# Patient Record
Sex: FEMALE | Race: OTHER | URBAN - METROPOLITAN AREA
[De-identification: names, ages, dates, MRNs, and addresses within clinical notes are randomized per-mention and may not be internally consistent; named-entity substitution may affect disease eponyms.]

---

## 2017-01-03 ENCOUNTER — OFFICE VISIT (OUTPATIENT)
Dept: FAMILY MEDICINE CLINIC | Age: 32
End: 2017-01-03

## 2017-01-03 VITALS
HEART RATE: 75 BPM | TEMPERATURE: 98.9 F | HEIGHT: 61 IN | SYSTOLIC BLOOD PRESSURE: 115 MMHG | BODY MASS INDEX: 30.21 KG/M2 | WEIGHT: 160 LBS | DIASTOLIC BLOOD PRESSURE: 64 MMHG

## 2017-01-03 DIAGNOSIS — D64.9 ANEMIA, UNSPECIFIED TYPE: Primary | ICD-10-CM

## 2017-01-03 LAB — HGB BLD-MCNC: 9.1 G/DL

## 2017-01-03 RX ORDER — DOCUSATE SODIUM 100 MG/1
100 CAPSULE, LIQUID FILLED ORAL 2 TIMES DAILY
Qty: 180 CAP | Refills: 1 | Status: SHIPPED | OUTPATIENT
Start: 2017-01-03

## 2017-01-03 RX ORDER — FERROUS SULFATE 325(65) MG
325 TABLET, DELAYED RELEASE (ENTERIC COATED) ORAL
Qty: 270 TAB | Refills: 1 | Status: SHIPPED | OUTPATIENT
Start: 2017-01-03

## 2017-01-03 NOTE — PROGRESS NOTES
Results for orders placed or performed in visit on 01/03/17   AMB POC HEMOGLOBIN (HGB)   Result Value Ref Range    Hemoglobin (POC) 9.1

## 2017-01-03 NOTE — PATIENT INSTRUCTIONS
Anemia por deficiencia de marivel: Instrucciones de cuidado - [ Iron Deficiency Anemia: Care Instructions ]  Instrucciones de cuidado    Tener anemia significa que usted no tiene suficientes glóbulos rojos. Los glóbulos rojos transportan oxígeno por el cuerpo. Si tiene anemia, esta puede hacer que se cassandra pálido y que esté débil y cansado. Muchas cosas pueden causar anemia. La causa más común es la pérdida de [place]. [place] puede ocurrir si tiene períodos [employer]. También puede suceder si tiene algún sangrado (hemorragia) en el estómago o los intestinos. También puede tener anemia si no obtiene suficiente marivel en de la cruz dieta o si de la cruz cuerpo tiene dificultades para absorber el marivel. En algunos casos, el embarazo produce anemia. [place] es porque ehsan paco embarazada necesita más marivel. De [place] médico tulio vez le zora más pruebas para encontrar la causa de de la cruz anemia. Si ehsan enfermedad u otro problema de [place] causándola, de la cruz médico tratará jose problema. Es importante que siga viendo a de la cruz médico para asegurarse de que de la cruz nivel de marivel vuelva a la normalidad. La atención de seguimiento es ehsan parte clave de de la cruz tratamiento y seguridad. Asegúrese de hacer y acudir a todas las citas, y llame a de la cruz médico si está teniendo problemas. También es ehsan buena idea saber los resultados de los exámenes y mantener ehsan lista de los medicamentos que matt. ¿Cómo puede cuidarse en el hogar? · Si de la cruz médico le recetó pastillas de marivel, tómelas según las indicaciones. ¨ Trate de tomarlas con el estómago vacío. Puede hacer esto aproximadamente 1 hora antes de comer o 2 horas después de comer. Luciano podría necesitar teresa el marivel con la comida para evitar el malestar estomacal.  ¨ No tome antiácidos, leche ni nada con cafeína dentro de las 2 horas de teresa de la cruz marivel. Esos productos pueden impedir que el cuerpo absorba isidra el marivel. ¨ La vitamina C ayuda a de la cruz organismo a absorber el marivel.  Sería conveniente teresa las pastillas de marivel con un vaso de jugo de naranja o con otro tipo de alimento rico en vitamina C.  ¨ Las pastillas de marivel podrían causarle problemas estomacales. Estos incluyen [employer], náuseas, diarrea, estreñimiento y retortijones. Puede ayudarle teresa bastante cantidad de líquidos e incluir frutas, verduras y [place] en de la cruz alimentación. ¨ Es normal que las pastilla de marivel marianna que martha heces mariela de color verdoso o grisáceo negruzco. Luciano el sangrado interno también puede producir heces oscuras. De modo que es importante que le avise a de la cruz médico acerca de cualquier cambio de color. ¨ Llame a de la cruz médico si zoie estar teniendo problemas con las pastillas de marivel. Incluso después de que empiece a sentirse mejor, de la cruz cuerpo tardará varios meses en acumular Maria A Ball de marivel. ¨ Si olvida treesa [employer], no tome ehsan dosis doble la siguiente vez. ¨ Mantenga las pastillas de marivel fuera del alcance de los niños pequeños. Demasiado marivel puede ser [employer]. · Coma alimentos con mucho marivel. Estos incluyen julio c forde, mariscos, aves y SANDEFELIZARD. Ryan Britoon frijoles (habichuelas), uvas pasas, pan de grano integral y verduras de [employer]. · Prepare las verduras al vapor. Esta es la mejor manera de prepararlas si quiere obtener la mayor cantidad de marivel posible. · Sea kathrine con los medicamentos. No tome analgésicos (medicamentos para el dolor) antiinflamatorios no esteroideos a menos que el médico se lo indique. Estos incluyen aspirina, naproxeno (Aleve) e ibuprofeno (Advil, Motrin). · Las formas líquidas de marivel pueden manchar martha dientes. Luciano usted puede mezclarlo con agua o jugo y beberlo con ehsan pajita (popote). [employer] modo, no se adherirá a martha dientes. ¿Cuándo debe pedir ayuda? Llame al 911 en cualquier momento que considere que necesita atención de [place]. Por ejemplo, llame si:  · Se desmayó (perdió el conocimiento).   · Vomita camilo o algo parecido a granos de café molido. · Las heces son de color rojizo o muy sanguinolentas (con camilo). Llame a de la cruz médico ahora mismo o busque atención médica inmediata si:  · Robyn heces son negruzcas y parecidas al alquitrán o tienen rastros de [place]. · Siente mareos o aturdimiento, o que está a punto de [place]. Preste especial atención a los cambios en de la cruz tiffany y asegúrese de comunicarse con de la cruz médico si:  · La fatiga y la debilidad continúan o [place]mol [place]. · Tiene efectos secundarios después de teresa pastillas de marivel, trenton náuseas, vómito, estreñimiento, diarrea o acidez estomacal.  · No mejora t[place]on se esperaba. ¿Dónde puede encontrar más información en inglés? Hayes Eriberto a [email or web address].[email or web address] Sara Woodward [id number] en la búsqueda para aprender más acerca de \"Anemia por deficiencia de marivel: Instrucciones de cuidado - [ Iron Deficiency Anemia: Care Instructions ]. \"  Revisado: 5 febrero, 2016  Versión del contenido: 11.1  © 7317-3349 Healthwise, Incorporated. Las instrucciones de cuidado fueron adaptadas bajo licencia por [employer] (which disclaims liability or warranty for this information). Si usted tiene [place] afección médica o sobre estas instrucciones, siempre pregunte a de la cruz profesional de tiffany. Healthwise, Incorporated niega toda garantía o responsabilidad por de la cruz uso de esta información.

## 2017-01-03 NOTE — PROGRESS NOTES
Assessment/Plan:       ICD-10-CM ICD-9-CM    1. Anemia, unspecified type D64.9 285.9 AMB POC HEMOGLOBIN (HGB)      ferrous sulfate (IRON) 325 mg (65 mg iron) EC tablet      docusate sodium (COLACE) 100 mg capsule     Follow-up Disposition:  Return in about 2 months (around 3/3/2017). [facility]  Subjective:   Davion Cash is a 36 y.o. PATIENT REFUSED female who speaks Serbian. Chief Complaint   Patient presents with    Eating Concern     craving to eat soil, fatigue and dizziness x 1 month. History of Present Illness: First 3 days really heavy. 2 days ago this period ended. She had low iron in her last pregnancy, has 3 children. She was taking iron then. Review of Systems: Negative for: fever, chest pain, shortness of breath, leg swelling, exertional dyspnea, palpitations. Past Surgical History: She  has no past surgical history on file. Social History: She  reports that she has never smoked. She does not have any smokeless tobacco history on file. She reports that she does not drink alcohol or use illicit drugs. Objective:     Vitals:    01/03/17 1330   BP: 115/64   Pulse: 75   Temp: 98.9 °F (37.2 °C)   TempSrc: Oral   Weight: 160 lb (72.6 kg)   Height: 5' 0.83\" (1.545 m)    Patient's last menstrual period was 12/27/2016. Wt Readings from Last 2 Encounters:   01/03/17 160 lb (72.6 kg)   09/02/14 164 lb (74.4 kg)     Lab Review:  Results for orders placed or performed in visit on 01/03/17   AMB POC HEMOGLOBIN (HGB)   Result Value Ref Range    Hemoglobin (POC) 9.1       Physical Examination: normal skin tone. General appearance - well developed, no acute distress. Chest - clear to auscultation. Heart - regular rate and rhythm without murmurs, rubs, or gallops. Abdomen - bowel sounds present x 4, NT, ND. Extremities - pulses intact. No peripheral edema. Assessment/Plan:   Alexandra Ugarte was seen today for eating concern.     Diagnoses and all orders for this visit:    Anemia, unspecified type  -     AMB POC HEMOGLOBIN (HGB)  -     ferrous sulfate (IRON) 325 mg (65 mg iron) EC tablet; Take 1 Tab by mouth three (3) times daily (with meals). For anemia. Radha 1 tab 3 veces al leticia con comida para anemia.  -     docusate sodium (COLACE) 100 mg capsule; Take 1 Cap by mouth two (2) times a day. To prevent constipation. Radha 1 tab 2 veces al leticia para prevenir estrenimientro. She is also taking Folic Acid since a week ago since she is not currently taking any prenatal vitamins. Return 2 months. Take iron with vit C/orange juice. Follow-up Disposition:  Return in about 2 months (around 3/3/2017). Leslie Cespedes, MSN, RN, FNP-BC, BC-ADM  Tu Burgess expressed understanding of this plan.

## 2022-03-20 PROBLEM — D64.9 ANEMIA: Status: ACTIVE | Noted: 2017-01-03

## 2022-10-18 ENCOUNTER — OFFICE VISIT (OUTPATIENT)
Dept: FAMILY MEDICINE CLINIC | Age: 37
End: 2022-10-18

## 2022-10-18 ENCOUNTER — HOSPITAL ENCOUNTER (OUTPATIENT)
Dept: LAB | Age: 37
Discharge: HOME | End: 2022-10-18

## 2022-10-18 VITALS
TEMPERATURE: 97.2 F | DIASTOLIC BLOOD PRESSURE: 54 MMHG | BODY MASS INDEX: 34.75 KG/M2 | HEART RATE: 63 BPM | HEIGHT: 60 IN | OXYGEN SATURATION: 97 % | WEIGHT: 177 LBS | SYSTOLIC BLOOD PRESSURE: 107 MMHG

## 2022-10-18 DIAGNOSIS — Z01.419 ENCOUNTER FOR WELL WOMAN EXAM: Primary | ICD-10-CM

## 2022-10-18 PROCEDURE — 99395 PREV VISIT EST AGE 18-39: CPT | Performed by: PHYSICIAN ASSISTANT

## 2022-10-18 PROCEDURE — 87624 HPV HI-RISK TYP POOLED RSLT: CPT

## 2022-10-18 PROCEDURE — 88175 CYTOPATH C/V AUTO FLUID REDO: CPT

## 2022-10-18 NOTE — PROGRESS NOTES
[facility]    When was your last pap smear and where? 2019    Any abnormal results from previous pap smears? no    Any problems with your breasts? no    Any family history of breast/ovarian cancer? Yes, maternal aunt, breast cancer    Do you have any kids? 4    Oldest 16 years 15 years 5 years youngest 3 years    Are you sexually active? yes    Are you using any type of birth control? If yes where do you go for this? No, tubal ligation or cut. Abuse screening completed this visit? yes    CMA printed, reviewed and handed patient AVS per provider instructions. Patient had no questions for CMA. This has been fully explained to the patient, who indicates understanding.

## 2022-10-18 NOTE — PROGRESS NOTES
Assessment/Plan:    Diagnoses and all orders for this visit:    1. Encounter for well woman exam  -     PAP IG, APTIMA HPV AND RFX 16/[birth year],45 ([id number]); Future        Follow-up and Dispositions    Return if symptoms worsen or fail to improve. Natasha Munson, STACIE Hernandez expressed understanding of this plan. An AVS was printed and given to the patient.      ----------------------------------------------------------------------    Chief Complaint   Patient presents with    Well Woman     Routine PAP exam         History of Present Illness: Well woman exam she is , 1 SAB, 4 c-sections  She is having normal, monthly periods. Not too heavy. Last 5-6 days  Has some premenstrual crampy pain at times, not enough to take meds  In a safe relationship, no risk for DV  Has had BTL after last pregnancy 3 years ago. Has been breast feeding until recently       History reviewed. No pertinent past medical history. Current Outpatient Medications   Medication Sig Dispense Refill    ferrous sulfate (IRON) 325 mg (65 mg iron) EC tablet Take 1 Tab by mouth three (3) times daily (with meals). For anemia. Radha 1 tab 3 veces al leticia con comida para anemia. (Patient not taking: Reported on 10/18/2022) 270 Tab 1    docusate sodium (COLACE) 100 mg capsule Take 1 Cap by mouth two (2) times a day. To prevent constipation. Radha 1 tab 2 veces al leticia para prevenir estrenimientro. (Patient not taking: Reported on 10/18/2022) 180 Cap 1       No Known Allergies    Social History     Tobacco Use    Smoking status: Never    Smokeless tobacco: Never   Substance Use Topics    Alcohol use: No    Drug use: No       History reviewed. No pertinent family history.     Physical Exam:     Visit Vitals  BP (!) 107/54 (BP 1 Location: Left upper arm, BP Patient Position: Sitting)   Pulse 63   Temp 97.2 °F (36.2 °C) (Temporal)   Ht 5' 0.24\" (1.53 m)   Wt 177 lb (80.3 kg)   LMP 10/12/2022   SpO2 97%   BMI 34.30 kg/m² A&Ox3  WDWN NAD  Respirations normal and non labored    Pelvic exam- ext neg for lesion or discharge, cervix neg for lesion or discharge, some menstrual blood still present, uterus and adnexal exam neg for mass or tenderness

## 2022-10-25 NOTE — PROGRESS NOTES
Please send letter to pt that her pap is negative and her next pap will be due in 5 years, 2027.  Thank you

## 2022-10-26 NOTE — PROGRESS NOTES
At provider's request, a letter with provider comments \"Pap smear is negative and next pap is in 5 years, 2027,\" was drafted and sent to the queue. To be printed and sent to the patient.

## 2023-05-22 RX ORDER — LANOLIN ALCOHOL/MO/W.PET/CERES
325 CREAM (GRAM) TOPICAL
COMMUNITY
Start: 2017-01-03

## 2023-05-22 RX ORDER — PSEUDOEPHEDRINE HCL 30 MG
100 TABLET ORAL 2 TIMES DAILY
COMMUNITY
Start: 2017-01-03

## 2024-05-15 ENCOUNTER — OFFICE VISIT (OUTPATIENT)
Age: 39
End: 2024-05-15

## 2024-05-15 VITALS
SYSTOLIC BLOOD PRESSURE: 95 MMHG | HEART RATE: 69 BPM | OXYGEN SATURATION: 96 % | DIASTOLIC BLOOD PRESSURE: 64 MMHG | BODY MASS INDEX: 33.87 KG/M2 | WEIGHT: 174.8 LBS | TEMPERATURE: 97.8 F

## 2024-05-15 DIAGNOSIS — E66.3 OVERWEIGHT: ICD-10-CM

## 2024-05-15 DIAGNOSIS — J18.9 COMMUNITY ACQUIRED PNEUMONIA OF LEFT UPPER LOBE OF LUNG: Primary | ICD-10-CM

## 2024-05-15 PROCEDURE — 99213 OFFICE O/P EST LOW 20 MIN: CPT | Performed by: PHYSICIAN ASSISTANT

## 2024-05-15 RX ORDER — GUAIFENESIN 600 MG/1
600 TABLET, EXTENDED RELEASE ORAL 2 TIMES DAILY PRN
Qty: 30 TABLET | Refills: 0 | Status: SHIPPED | OUTPATIENT
Start: 2024-05-15

## 2024-05-15 SDOH — SOCIAL STABILITY: SOCIAL NETWORK: HOW OFTEN DO YOU ATTEND CHURCH OR RELIGIOUS SERVICES?: MORE THAN 4 TIMES PER YEAR

## 2024-05-15 SDOH — ECONOMIC STABILITY: TRANSPORTATION INSECURITY
IN THE PAST 12 MONTHS, HAS THE LACK OF TRANSPORTATION KEPT YOU FROM MEDICAL APPOINTMENTS OR FROM GETTING MEDICATIONS?: NO

## 2024-05-15 SDOH — ECONOMIC STABILITY: INCOME INSECURITY: IN THE LAST 12 MONTHS, WAS THERE A TIME WHEN YOU WERE NOT ABLE TO PAY THE MORTGAGE OR RENT ON TIME?: NO

## 2024-05-15 SDOH — ECONOMIC STABILITY: INCOME INSECURITY: HOW HARD IS IT FOR YOU TO PAY FOR THE VERY BASICS LIKE FOOD, HOUSING, MEDICAL CARE, AND HEATING?: NOT HARD AT ALL

## 2024-05-15 SDOH — ECONOMIC STABILITY: FOOD INSECURITY: WITHIN THE PAST 12 MONTHS, YOU WORRIED THAT YOUR FOOD WOULD RUN OUT BEFORE YOU GOT MONEY TO BUY MORE.: NEVER TRUE

## 2024-05-15 SDOH — SOCIAL STABILITY: SOCIAL INSECURITY: WITHIN THE LAST YEAR, HAVE YOU BEEN HUMILIATED OR EMOTIONALLY ABUSED IN OTHER WAYS BY YOUR PARTNER OR EX-PARTNER?: NO

## 2024-05-15 SDOH — SOCIAL STABILITY: SOCIAL NETWORK
DO YOU BELONG TO ANY CLUBS OR ORGANIZATIONS SUCH AS CHURCH GROUPS UNIONS, FRATERNAL OR ATHLETIC GROUPS, OR SCHOOL GROUPS?: NO

## 2024-05-15 SDOH — SOCIAL STABILITY: SOCIAL NETWORK: ARE YOU MARRIED, WIDOWED, DIVORCED, SEPARATED, NEVER MARRIED, OR LIVING WITH A PARTNER?: MARRIED

## 2024-05-15 SDOH — ECONOMIC STABILITY: FOOD INSECURITY: WITHIN THE PAST 12 MONTHS, THE FOOD YOU BOUGHT JUST DIDN'T LAST AND YOU DIDN'T HAVE MONEY TO GET MORE.: NEVER TRUE

## 2024-05-15 SDOH — SOCIAL STABILITY: SOCIAL INSECURITY
WITHIN THE LAST YEAR, HAVE TO BEEN RAPED OR FORCED TO HAVE ANY KIND OF SEXUAL ACTIVITY BY YOUR PARTNER OR EX-PARTNER?: NO

## 2024-05-15 SDOH — HEALTH STABILITY: MENTAL HEALTH
STRESS IS WHEN SOMEONE FEELS TENSE, NERVOUS, ANXIOUS, OR CAN'T SLEEP AT NIGHT BECAUSE THEIR MIND IS TROUBLED. HOW STRESSED ARE YOU?: ONLY A LITTLE

## 2024-05-15 SDOH — SOCIAL STABILITY: SOCIAL INSECURITY: WITHIN THE LAST YEAR, HAVE YOU BEEN AFRAID OF YOUR PARTNER OR EX-PARTNER?: NO

## 2024-05-15 SDOH — ECONOMIC STABILITY: HOUSING INSECURITY
IN THE LAST 12 MONTHS, WAS THERE A TIME WHEN YOU DID NOT HAVE A STEADY PLACE TO SLEEP OR SLEPT IN A SHELTER (INCLUDING NOW)?: NO

## 2024-05-15 SDOH — SOCIAL STABILITY: SOCIAL INSECURITY
WITHIN THE LAST YEAR, HAVE YOU BEEN KICKED, HIT, SLAPPED, OR OTHERWISE PHYSICALLY HURT BY YOUR PARTNER OR EX-PARTNER?: NO

## 2024-05-15 SDOH — SOCIAL STABILITY: SOCIAL NETWORK
IN A TYPICAL WEEK, HOW MANY TIMES DO YOU TALK ON THE PHONE WITH FAMILY, FRIENDS, OR NEIGHBORS?: MORE THAN THREE TIMES A WEEK

## 2024-05-15 SDOH — HEALTH STABILITY: MENTAL HEALTH: HOW MANY STANDARD DRINKS CONTAINING ALCOHOL DO YOU HAVE ON A TYPICAL DAY?: PATIENT DOES NOT DRINK

## 2024-05-15 SDOH — HEALTH STABILITY: MENTAL HEALTH: HOW OFTEN DO YOU HAVE A DRINK CONTAINING ALCOHOL?: NEVER

## 2024-05-15 SDOH — SOCIAL STABILITY: SOCIAL NETWORK: HOW OFTEN DO YOU ATTENT MEETINGS OF THE CLUB OR ORGANIZATION YOU BELONG TO?: NEVER

## 2024-05-15 SDOH — ECONOMIC STABILITY: TRANSPORTATION INSECURITY
IN THE PAST 12 MONTHS, HAS LACK OF TRANSPORTATION KEPT YOU FROM MEETINGS, WORK, OR FROM GETTING THINGS NEEDED FOR DAILY LIVING?: NO

## 2024-05-15 SDOH — HEALTH STABILITY: PHYSICAL HEALTH: ON AVERAGE, HOW MANY MINUTES DO YOU ENGAGE IN EXERCISE AT THIS LEVEL?: 20 MIN

## 2024-05-15 SDOH — HEALTH STABILITY: PHYSICAL HEALTH: ON AVERAGE, HOW MANY DAYS PER WEEK DO YOU ENGAGE IN MODERATE TO STRENUOUS EXERCISE (LIKE A BRISK WALK)?: 1 DAY

## 2024-05-15 SDOH — ECONOMIC STABILITY: HOUSING INSECURITY: IN THE LAST 12 MONTHS, HOW MANY PLACES HAVE YOU LIVED?: 2

## 2024-05-15 SDOH — SOCIAL STABILITY: SOCIAL NETWORK: HOW OFTEN DO YOU GET TOGETHER WITH FRIENDS OR RELATIVES?: TWICE A WEEK

## 2024-05-15 ASSESSMENT — PATIENT HEALTH QUESTIONNAIRE - PHQ9
1. LITTLE INTEREST OR PLEASURE IN DOING THINGS: SEVERAL DAYS
SUM OF ALL RESPONSES TO PHQ QUESTIONS 1-9: 2
2. FEELING DOWN, DEPRESSED OR HOPELESS: SEVERAL DAYS
SUM OF ALL RESPONSES TO PHQ QUESTIONS 1-9: 2
SUM OF ALL RESPONSES TO PHQ9 QUESTIONS 1 & 2: 2

## 2024-05-15 NOTE — PROGRESS NOTES
\"Have you been to the ER, urgent care clinic since your last visit?  Hospitalized since your last visit?\"    NO    “Have you seen or consulted any other health care providers outside of [facility] since your last visit?”    NO            Click Here for Release of Records Request'

## 2024-05-15 NOTE — PROGRESS NOTES
Assessment/Plan:    Sharita was seen today for follow-up.    Diagnoses and all orders for this visit:    Community acquired pneumonia of left upper lobe of lung  -     guaiFENesin (MUCINEX) 600 MG extended release tablet; Take 1 tablet by mouth 2 times daily as needed for Congestion Take with a glass of water  Lungs are clear today  Still with mild phlegm production, take mucinex with large glass of water    Overweight  -     [facility] - [address], [facility], [facility]    F/up PRN     No follow-up provider specified.    Asha Lancaster PA-C  Sharita Bond expressed understanding of this plan. An AVS was printed and given to the patient.      ----------------------------------------------------------------------    Chief Complaint   Patient presents with    Follow-up     Pt comes in for cough fu        History of Present Illness:  Pt presents for CAP f/up visit. She finished the doxy 2 days ago. She tolerated it well. She is improved. She has not had a fever since before abx. She has some mild phlegm production.   She is concerned about her weight. She was 130-140 before her last pregnancy. She agrees to seeing our dietician to get nutrition advice       No past medical history on file.    Current Outpatient Medications   Medication Sig Dispense Refill    guaiFENesin (MUCINEX) 600 MG extended release tablet Take 1 tablet by mouth 2 times daily as needed for Congestion Take with a glass of water 30 tablet 0    docusate (COLACE, DULCOLAX) 100 MG CAPS Take 100 mg by mouth 2 times daily (Patient not taking: Reported on 5/6/2024)      ferrous sulfate (FE TABS 325) 325 (65 Fe) MG EC tablet Take 1 tablet by mouth 3 times daily (with meals) (Patient not taking: Reported on 5/6/2024)       No current facility-administered medications for this visit.       No Known Allergies    Social History     Tobacco Use    Smoking status: Never    Smokeless tobacco: Never   Substance Use Topics

## 2024-05-15 NOTE — PROGRESS NOTES
Sharita Bond seen at d/c, full name and  verified, given After visit Summary and reviewed today's visit with patient along with instructions on when it is recommended to come back.  Mucinex medication reviewed with patient as well as how to take.  Good Rx coupons given/texted to patient as well as instructions on how to use at the pharmacy.  I have reviewed the provider's instructions with the patient, answering all questions to her satisfaction. Patient verbalized understanding.  Oly Du RN

## 2024-07-23 ENCOUNTER — OFFICE VISIT (OUTPATIENT)
Age: 39
End: 2024-07-23

## 2024-07-23 DIAGNOSIS — Z71.3 DIETARY COUNSELING AND SURVEILLANCE: Primary | ICD-10-CM

## 2024-07-23 NOTE — PROGRESS NOTES
[facility]   Nutrition Assessment - Medical Nutrition Therapy   INITIAL EVALUATION     Patient Name: Sharita Bond : 1985   Referral Source: [facility] Start of Care (SOC): 2024   Reason for visit: Overweight         Age: 39 y.o. Sex: female   Ht: Ht Readings from Last 1 Encounters:   10/18/22 1.53 m (5' 0.24\")    Wt: Wt Readings from Last 1 Encounters:   05/15/24 79.3 kg (174 lb 12.8 oz)      BMI: 33.87 kg/m2 Category: Class 1 Obesity (30 - 34.9)   Weight Hx: Wt Readings from Last 10 Encounters:   05/15/24 79.3 kg (174 lb 12.8 oz)   24 78.5 kg (173 lb)   10/18/22 80.3 kg (177 lb)        Past Medical Hx:  Patient Active Problem List   Diagnosis    Pityriasis rosea    Anemia        Pertinent Medications:     Current Outpatient Medications:     guaiFENesin (MUCINEX) 600 MG extended release tablet, Take 1 tablet by mouth 2 times daily as needed for Congestion Take with a glass of water, Disp: 30 tablet, Rfl: 0    docusate (COLACE, DULCOLAX) 100 MG CAPS, Take 100 mg by mouth 2 times daily (Patient not taking: Reported on 2024), Disp: , Rfl:     ferrous sulfate (FE TABS 325) 325 (65 Fe) MG EC tablet, Take 1 tablet by mouth 3 times daily (with meals) (Patient not taking: Reported on 2024), Disp: , Rfl:      Biochemical Data:   Latest Lab Result Choices:   No results found for: \"LABA1C\", \"HBA1C\", \"MICROALBUMIN\", \"CREATININE\", \"MALBCR\", \"LIPIDPAN\"      No results found for: \"GLUCOSE\"    No results found for: \"POCGLU\"     Patient Reported Diet/Health History:  Sharita is here for concerns about her weight. She has successfully lost 25 lbs in the past by eliminating sodas and exercising (walking and running).    Has recently given up sodas again and trying to eat less at night. Previously would wait and eat her dinner when her  came home at 8pm, but is eating dinner earlier now. Is also starting to reduce her portions sizes (eating 3 tortillas instead

## 2024-09-23 ENCOUNTER — OFFICE VISIT (OUTPATIENT)
Age: 39
End: 2024-09-23

## 2024-09-23 VITALS — BODY MASS INDEX: 34.1 KG/M2 | WEIGHT: 176 LBS

## 2024-09-23 DIAGNOSIS — Z71.3 DIETARY COUNSELING AND SURVEILLANCE: Primary | ICD-10-CM
